# Patient Record
Sex: FEMALE | NOT HISPANIC OR LATINO | Employment: FULL TIME | URBAN - METROPOLITAN AREA
[De-identification: names, ages, dates, MRNs, and addresses within clinical notes are randomized per-mention and may not be internally consistent; named-entity substitution may affect disease eponyms.]

---

## 2017-01-10 ENCOUNTER — TELEPHONE (OUTPATIENT)
Dept: TRANSPLANT | Facility: CLINIC | Age: 57
End: 2017-01-10

## 2017-01-12 ENCOUNTER — DOCUMENTATION ONLY (OUTPATIENT)
Dept: TRANSPLANT | Facility: CLINIC | Age: 57
End: 2017-01-12

## 2017-01-12 NOTE — NURSING
From: Sera Vasquez   Sent: Thursday, January 12, 2017 2:41 PM  To: 'Elle Rawls'; Maricruz Rogers  Subject: Fernie MCNEIL <encrypt>    Tani Be [id number]--    I've reviewed her chart and the following tests are needed to complete her evaluation.  CXR  HCV RNA  Quantiferon Gold    Also, review of the March 2016 stress test showed an area of perfusion defect which could not exclude ishemia.  A Myuocardial perfusion scan with dual-isotobe technique, attention to breast positioning and prone stress imaging was rec ommended.  I was unable to find this test result in Care Everywhere.  If it has been done, please forward the results, if not, please schedule as soon as possible.    Her recent chest CT shows a 3.3 cm thyroid nodule and small axillary lymph nodes.  Please schedule with Endocrinology for thyroid nodule us and biopsy.    The December 19th radiology disc has been received and will be reviewed at IR conference on 1/17/17.    Thanks,  Sera

## 2017-01-16 ENCOUNTER — CONFERENCE (OUTPATIENT)
Dept: TRANSPLANT | Facility: CLINIC | Age: 57
End: 2017-01-16

## 2017-01-17 NOTE — TELEPHONE ENCOUNTER
To:  [email or web address]   Cc:  [email or web address]   Sent:  Mon Jan 16, 2017 10:18 PM (7 minutes ago)   Subject:  RE: (phi)    Brigette Serrano's films were reviewed by Dr. Green--discussion/plan:   no residual post rx. Arterial portal shunt on left likely post rx, rec repeat imaging 3 months    Please schedule a repeat multiphase CT and AFP in 3 months (March 2017) and mail disc for review when available.     Any updates on the follow-up cardiac testing or thyroid nodule biopsy??     Pleaes let me know when the cxr, HCVRNA, and quantiferion gold are scheduled    Thanks  Sera

## 2017-01-17 NOTE — TELEPHONE ENCOUNTER
LIVER CONFERENCE 1/17/2017    Radiology review by Dr. Rafa Be [id number] (Juan) LTX--in eval.  re:  CT to further evaluate post RFA tumor burden (ext disc 12/19/16--sent to rad 1/12)  57 yo with hepatitis C and alcohol use, complicated by hepatocellular carcinoma.  Her hepatocellular carcinoma was diagnosed in October 2014, there was a 1.4 cm lesion suspicious for HCC. She had chemoembolization done in April 2015. Post treatment imaging revealed a indeterminate segment 8 lesion +/- 2 additional lesions in segments 6 and 3 (depending on study).  Surveillance imaging done May and June 2016 demonstrated interval growth of the segment 8 lesion from ~2.5 cm to 3.3 cm.  TACE was repeated on 9/21/16.  Post TACE CT and MR done 10/2016 demonstrated interval growth, measuring the lesion at 4.0 cm.  Microwave ablation was performed on 11/15/16.  Patient had ERCP for hemobilia after the ablation procedure (developed biliary dilation, jaundice, and persistent severe abdominal pain, and fever.)   Previous IR review:  June 2016 study shows a 3.3 cm lesion with mild heterogeneous enhancement and washout, meeting OPTN criteria for HCC. Current study shows a 4.0 cm lesion with questionable enhancement and washout; can't exclude residual tumor.   Plan: TPCT now to further assess post treatment results. Update evaluation and present to Selection Committee.     Discussion/Plan:  remove: no residual post rx. Arterial portal shunt on left likely post rx, rec repeat imaging 3 months    Danae Perez RN and Elle Rawls RN notified.

## 2017-03-15 ENCOUNTER — DOCUMENTATION ONLY (OUTPATIENT)
Dept: TRANSPLANT | Facility: CLINIC | Age: 57
End: 2017-03-15

## 2017-03-15 NOTE — PROGRESS NOTES
DIPESH DISCUSSION  PRESENT Dr KHAN/PEDRO/BHARAT/NELIA/ALIA    PLAN: . From HCC perspective pt cleared to come to complete evaluation. pt was pending cardiac studies and thyroid biopsy. Rolette to discuss with Dipesh pino

## 2017-03-29 ENCOUNTER — DOCUMENTATION ONLY (OUTPATIENT)
Dept: TRANSPLANT | Facility: CLINIC | Age: 57
End: 2017-03-29

## 2017-06-04 ENCOUNTER — DOCUMENTATION ONLY (OUTPATIENT)
Dept: TRANSPLANT | Facility: CLINIC | Age: 57
End: 2017-06-04

## 2017-06-04 NOTE — NURSING
From: Lisandro Banda M.D.  Sent: Tuesday, May 30, 2017 9:15 PM  To: Christin Ortega  Cc: Danny Lee M.D.; Tree Ferrari; Donna Otero; Maria D Stephens; Maria D Orellana; Sera Vasquez; Ivon Franco; Genet Inman; Eve Landaverde; Shayne Clay; Clare Latif; Trisha Lowe; Yulia Nguyen; Reyna Mcclain; Desi Hopkins; Katelyn Gutierrez; Colette Tejeda; Kayla Hickman; Laura Rivera; Luis Antonio Mckeon M.D.; Rubi Martinez M.D.; Stephanie Wheeler M.D.; Morris Ortiz M.D.; Yeimi Wynn M.D.; Wayne Moran M.D.; Deo Whitfield M.D.; Ever Perez Jr; Linda Mckinney; Luis Antonio Cohen M.D.; Dusty Rodriguez M.D.; Chencho Degroot M.D.  Subject: Re: [facility]azo call 5/31 at 1:20 pm      I spoke with one of our endocrine surgeons Dr Giordano regarding the thyroid masses. Papillary lesions under 1cm are considered microcarcinomas and come with a very low risk of recurrence and mortality. It shouldn't be a barrier to transplant, but we can wait to hear from Dr Cherry if folks would prefer.  Lisandro Banda MD  Abdominal Transplantation and [facility]

## 2017-06-30 ENCOUNTER — CONFERENCE (OUTPATIENT)
Dept: TRANSPLANT | Facility: CLINIC | Age: 57
End: 2017-06-30

## 2017-06-30 NOTE — TELEPHONE ENCOUNTER
.  Patient: Elisa Be       MRN: [id number]      : 1960     Age: 56 y.o.   [id number]  [facility] [address]    Provider: Hepatologist Monse Martinez    Patient Transplant Status: Other cleared for evaluation but pending endo clearance and review of updated imagng    Reason for presentation: Reassessment    Clinical Summary: Diagnosis of HCV who was diagnosed with a 1.4 cm (s8/4) enhanc lesion w/washout and 2 <1.0 cm enhanc lesions (s8, s7) Oct 2014.  She was treated with TACE 2015. Surveillance imaging remained negative for recurrence, however, on MR done 10/2015 showed a new 2.1 s6 lesion which was not seen on repeat MR 2016.  The treated s8 lesion demonstrated interval growth over time, measuring 3.3 on MR done 2016.  TACE was repeated 2016.  On post treatment MR and CT done 10/2016 the lesion measured 4.0 cm. She was evaluated and deferred 10/2016 due to low MELD and lesions not meeting OPTN criteria for HCC.  [facility] sent additional images and requested further review.  Per conference  2016 a 3.3 cm lesion meeting HCC criteria was seen on MR 2016 which measured 4.0 on 10/2016 study.  MWA was done 2016.  Negative surveillance imaging until 2017 which showed a 2.8 cm s7 hcc, treated s4, and ??infiltrative mass/adenopathy centered in the jane hepatis compressing, possibly infiltrating the PV. Repeat TACE was done 17.          Imaging to be reviewed: MR 17; CT 17; MR 10/30/14; MR 10/22/15; MR 16; MR 16; MR 10/20/16; CT 10/18/16    HCC Treatment History: TACE 4/13/15, 16, 17.  Microwave ablation 11/15/16  AFP peaked at 2014; currently 7.2017    ABO: O POS    Platelets:   Lab Results   Component Value Date/Time    PLT 98 (L) 2016 07:10 AM     Creatinine:   Lab Results   Component Value Date/Time    CREATININE 0.7 2016 07:10 AM     Bilirubin:   Lab Results   Component Value Date/Time    BILITOT 1.8 (H) 2016 07:10 AM     AFP Last 3  each if available:   Lab Results   Component Value Date/Time    AFP 4.4 07/14/2016 07:10 AM       MELD: MELD-Na score: 10 at 7/14/2016  7:10 AM  MELD score: 10 at 7/14/2016  7:10 AM  Calculated from:  Serum Creatinine: 0.7 mg/dL (Rounded to 1) at 7/14/2016  7:10 AM  Serum Sodium: 142 mmol/L (Rounded to 137) at 7/14/2016  7:10 AM  Total Bilirubin: 1.8 mg/dL at 7/14/2016  7:10 AM  INR(ratio): 1.1 at 7/14/2016  7:10 AM  Age: 55 years    Plan: seg 4a 3.6 cm treated.  New lesion 2.8 s7 w/enhanement and washout.  Partially thrombosed MPV.  LPV ???  Recanalization of UV w/enhancement seen on prior study. Collateral vessels in that area.  No real since of infiltrative process.  Repeat TACE of new lesion.  Then reimage.  Discuss at Selection     Follow-up Provider: Rubi Martinez

## 2017-07-12 ENCOUNTER — COMMITTEE REVIEW (OUTPATIENT)
Dept: TRANSPLANT | Facility: CLINIC | Age: 57
End: 2017-07-12

## 2017-07-12 NOTE — COMMITTEE REVIEW
Continue treatment in [place]. Will discuss with [facility].  I was present and agree with the committee's conclusions.

## 2017-07-12 NOTE — PROGRESS NOTES
Per [facility] coordinator, transplant account with [facility] is closed. [place] will need to decide whether they will re-refer.